# Patient Record
(demographics unavailable — no encounter records)

---

## 2024-10-31 NOTE — HISTORY OF PRESENT ILLNESS
[FreeTextEntry1] : Mr. Dias is  a 67-year-old male with PMHx of recurrent cryptogenic CVA, HTN, DLD, DM, mild cerebral palsy, is here for management of long-term monitoring s/p CVA.   Last CVA in 2024.   He underwent ILR implant for long term monitoring for occult AF.   Denies chest pain, shortness of breath, palpitation, dizziness or LOC except noted above.  EKG (09/04/2024): SR @ 88, , QRS 74, [id number] Echo (08/2023): Normal EF Cardio: Dr. Parker

## 2024-10-31 NOTE — PHYSICAL EXAM
[Normal] : normal S1, S2, no murmur, no rub, no gallop [General Appearance - Well Developed] : well developed [Normal Appearance] : normal appearance [Well Groomed] : well groomed [General Appearance - Well Nourished] : well nourished [No Deformities] : no deformities [General Appearance - In No Acute Distress] : no acute distress [Heart Rate And Rhythm] : heart rate and rhythm were normal [Heart Sounds] : normal S1 and S2 [Murmurs] : no murmurs present [Arterial Pulses Normal] : the arterial pulses were normal [] : no respiratory distress [Clean] : clean [Dry] : dry [Well-Healed] : well-healed [FreeTextEntry1] : Left parasternal [Abdomen Soft] : soft [Nail Clubbing] : no clubbing of the fingernails [Cyanosis, Localized] : no localized cyanosis

## 2024-10-31 NOTE — ASSESSMENT
[FreeTextEntry1] : ## Cryptogenic CVA  s/p ILR ## HTN  - Wound is well healed. There are no signs or symptoms of infection of inflammation. There is no redness, no swelling, no erythema. The patient has no pain. - Device interrogation normal. I interrogated and reprogrammed the device as described above. - Patient is enrolled in remote monitoring services. I reviewed remote transmission process with the patient, as well as schedule and ability to do manual transmission. We also spoke about associated co-payments with remote monitoring that may not be covered by insurance.   - BP controlled today. Continue with Losartan. - Risk factor modification. - RTO in 9-12 months

## 2024-10-31 NOTE — PROCEDURE
[Pacemaker] : pacemaker [de-identified] : PEYMAN [de-identified] : LINQ II [de-identified] : [id number] [de-identified] : 10/8/2024 [de-identified] : Good [de-identified] : No events

## 2024-12-05 NOTE — HISTORY OF PRESENT ILLNESS
[FreeTextEntry1] : Mr. Dias is a 67-year-old man with history of CVA 2005 without residual deficits and recurrent CVA 2024, hypertension, dyslipidemia, diabetes, and mild cerebral palsy presenting for evaluation of lower extremity swelling.  Overall reports being in good health. Previously was very physically active, in his youth would regularly jog 6 miles without issue. More recently developed intermittent palpitations, lasting up to a few minutes at a time. No overt chest discomfort or dyspnea on exertion.  ECG with NSR, normal axis, non-specific T wave abnormality, possible LAE.  LE Duplex 7/2023 - No DVT or superficial thrombophlebitis bilaterally TTE 8/2023 - Normal systolic function, indeterminate diastolic function, fibrocalcific Ao with focal calcification along the NCC, MAC TTE 7/2024: EF 65%, G1DD, degenerative MV, sclerotic Ao, negative bubble study 3 Day Event Monitor 9/2023 - No AF, no SVT, <0.01% PVC burden, no events  Recently was seen by ophtho for vision changes and was referred to the ED for CVA evaluation. MRI demonstrated a new small infarct. He was seen by neurology and recommended cardiology evaluation for possible cardioembolic CVA.  He underwent ILR implant and is following with EP. Today feels well, denies any active cardiopulmonary complaints.  Labs: - Cr 1.0, K 4.2, normal transaminases - A1c 5.5% - , TG 94, LDL 89, HDL 45, A1c 5.9%, TSH 1.62  Meds: - ASA 81mg - Rosuvastatin 20mg - Losartan 100mg - Glipizide 10mg - Tamsulosin 0.4mg

## 2024-12-05 NOTE — ASSESSMENT
[FreeTextEntry1] : Mr. Dias is a 67-year-old man with history of hypertension, dyslipidemia, diabetes, and mild cerebral palsy presenting for evaluation of lower extremity swelling.  Impression: (1) Palpitations, abnormal ECG (LAE, non-specific T wave abnormality) (2) HTN, well controlled (3) DM2, on glipizide, well controlled (4) Dyslipidemia, no recent lipids on file, taking simvastatin 40mg (5) Fibrocalcific Ao with focal calcification of the NCC, as well as MAC (6) CVA 2005 and recurrent CVA 2024, concern for cardioembolic source, negative bubble study  Plan: - Rosuvastatin 20mg - EP evaluation for ILR - Continue aspirin - Continue valsartan 160mg - Discussed GLP1a use, he will consider  Follow up in 1-2 months

## 2025-01-16 NOTE — PHYSICAL EXAM
[General Appearance - Well Developed] : well developed [General Appearance - Well Nourished] : well nourished [Normal Appearance] : normal appearance [Well Groomed] : well groomed [General Appearance - In No Acute Distress] : no acute distress [] : no respiratory distress [Respiration, Rhythm And Depth] : normal respiratory rhythm and effort [Exaggerated Use Of Accessory Muscles For Inspiration] : no accessory muscle use [Abdomen Soft] : soft [Abdomen Tenderness] : non-tender [Costovertebral Angle Tenderness] : no ~M costovertebral angle tenderness [Urethral Meatus] : meatus normal [Penis Abnormality] : normal circumcised penis [Scrotum] : the scrotum was normal [Testes Mass (___cm)] : there were no testicular masses [Anus Abnormality] : the anus and perineum were normal [Rectal Exam - Rectum] : digital rectal exam was normal [Prostate Tenderness] : the prostate was not tender [No Prostate Nodules] : no prostate nodules [Prostate Size ___ gm] : prostate size [unfilled] gm [Normal Station and Gait] : the gait and station were normal for the patient's age [No Focal Deficits] : no focal deficits [Oriented To Time, Place, And Person] : oriented to person, place, and time [Affect] : the affect was normal [Mood] : the mood was normal [Not Anxious] : not anxious [FreeTextEntry1] : sulcus + // smooth- 2023 visit

## 2025-01-16 NOTE — HISTORY OF PRESENT ILLNESS
[Urinary Frequency] : urinary frequency [Dysuria] : dysuria [None] : None [FreeTextEntry1] : 68 y/o M with h/o BPH last seen on our office 10/2023 here for f/u patient is maintained on silodosin 8mg with good results voiding pattern remains unchanged  nocturia 1x good stream he denies dysuria or hematuria  previous history:  initially here to follow up after he was seen in the emergency room in 07/2023 for abdominal pain, rectal bleeding and loose bowel movements. At that time, he was told he had a UTI. He was diagnosed with proctitis and given Abx.   Past and present data reviewed:  bladder scan today 1/15/25-  3ml  TRUS- 10/2023-  64gm, no suspicious nodules identified. Slight asymmetry right lobe greater than left. Calcifications noted bilaterally.  9/2023  Renal/bladder us- Right kidney: 12.5 cm. No hydronephrosis or calculi. Upper pole cyst measuring 7.4 cm. Left kidney:  5.0 cm. No hydronephrosis or calculi. Multiple cysts largest measuring 2.8 cm in the upper pole. Urinary bladder: No debris or calculus. Bilateral ureteral jets are visualized. Prevoid volume of approximately 323 ml.  Postvoid volume is approximately 207 ml. Prostate: Measuring 5.2 x 4.6 x 5.4 cm, volume measuring 67 mL. Appears heterogeneous  07/01/2023 CT scan= bilateral renal cysts, left upper pole calyces diverticulum with internal calcifications associate with cortical scaring, bilateral hydronephrosis mild to bladder secondary to bladder distension.   06/2024  PSA= 0.9 05/2023 PSA (PCP)= 0.7 5/2020 PSA- 1.1 03/2021 PSA= 0.7  06/2024  UA- neg RBC 09/2023 UA= neg 07/01/2023 UA= 4 RBC, bacteria negative nitrite negative,   [Weak Stream] : no weak stream

## 2025-01-16 NOTE — ASSESSMENT
[FreeTextEntry1] : 1. BPH- PVR improved on silodosin 8mg 2. bilateral renal cysts 3. left renal lower pole diverticulum with calcifications 4. LUTS- stable  Plan: -medication renewed -annual PSA testing -rto 12 months  total time spent with encounter including face to face time with patient and review of chart and plan totaled 25 minutes

## 2025-02-04 NOTE — ASSESSMENT
[FreeTextEntry1] : 1. BPH- increased LUTS on silodosin 8mg 2. bilateral renal cysts 3. left renal lower pole diverticulum with calcifications   Plan: -switch to alfuzosin 10mg- dc silodosin -rto 4 weeks for bladder scan and symptom check -annual PSA testing   total time spent with encounter including face to face time with patient and review of chart and plan totaled 25 minutes

## 2025-02-04 NOTE — HISTORY OF PRESENT ILLNESS
[FreeTextEntry1] : 68 y/o M with h/o BPH seen on our office approx 3 weeks ago- presents on a sooner than scheduled visit with c/o increased LUTS x a few days pt reports increased nocturia associated with a variable stream patient is maintained on silodosin 8mg (was previously on Flomax but switched too silodosin for elevated PVR) he denies dysuria, hematuria, constipation or other inciting factors to this increase in symptoms   previous history:  initially here to follow up after he was seen in the emergency room in 07/2023 for abdominal pain, rectal bleeding and loose bowel movements. At that time, he was told he had a UTI. He was diagnosed with proctitis and given Abx.   Past and present data reviewed:  bladder scan 1/15/25-  3ml bladder scan today 2/4/25-  198ml Udip- neg  TRUS- 10/2023-  64gm, no suspicious nodules identified. Slight asymmetry right lobe greater than left. Calcifications noted bilaterally.  9/2023  Renal/bladder us- Right kidney: 12.5 cm. No hydronephrosis or calculi. Upper pole cyst measuring 7.4 cm. Left kidney:  5.0 cm. No hydronephrosis or calculi. Multiple cysts largest measuring 2.8 cm in the upper pole. Urinary bladder: No debris or calculus. Bilateral ureteral jets are visualized. Prevoid volume of approximately 323 ml.  Postvoid volume is approximately 207 ml. Prostate: Measuring 5.2 x 4.6 x 5.4 cm, volume measuring 67 mL. Appears heterogeneous  07/01/2023 CT scan= bilateral renal cysts, left upper pole calyces diverticulum with internal calcifications associate with cortical scaring, bilateral hydronephrosis mild to bladder secondary to bladder distension.   06/2024  PSA= 0.9 05/2023 PSA (PCP)= 0.7 5/2020 PSA- 1.1 03/2021 PSA= 0.7  06/2024  UA- neg RBC 09/2023 UA= neg 07/01/2023 UA= 4 RBC, bacteria negative nitrite negative,   [Urinary Frequency] : urinary frequency [Weak Stream] : no weak stream [Dysuria] : dysuria [None] : None

## 2025-02-04 NOTE — PHYSICAL EXAM
[General Appearance - Well Developed] : well developed [General Appearance - Well Nourished] : well nourished [Normal Appearance] : normal appearance [Well Groomed] : well groomed [General Appearance - In No Acute Distress] : no acute distress [] : no respiratory distress [Respiration, Rhythm And Depth] : normal respiratory rhythm and effort [Exaggerated Use Of Accessory Muscles For Inspiration] : no accessory muscle use [Abdomen Soft] : soft [Abdomen Tenderness] : non-tender [Costovertebral Angle Tenderness] : no ~M costovertebral angle tenderness [Urethral Meatus] : meatus normal [Penis Abnormality] : normal circumcised penis [Scrotum] : the scrotum was normal [Testes Mass (___cm)] : there were no testicular masses [Anus Abnormality] : the anus and perineum were normal [Rectal Exam - Rectum] : digital rectal exam was normal [Prostate Tenderness] : the prostate was not tender [No Prostate Nodules] : no prostate nodules [Prostate Size ___ gm] : prostate size [unfilled] gm [FreeTextEntry1] : sulcus + // smooth- 2023 visit [Normal Station and Gait] : the gait and station were normal for the patient's age [No Focal Deficits] : no focal deficits [Oriented To Time, Place, And Person] : oriented to person, place, and time [Affect] : the affect was normal [Mood] : the mood was normal [Not Anxious] : not anxious

## 2025-03-05 NOTE — PHYSICAL EXAM
[General Appearance - Well Developed] : well developed [General Appearance - Well Nourished] : well nourished [Normal Appearance] : normal appearance [Well Groomed] : well groomed [General Appearance - In No Acute Distress] : no acute distress [] : no respiratory distress [Respiration, Rhythm And Depth] : normal respiratory rhythm and effort [Exaggerated Use Of Accessory Muscles For Inspiration] : no accessory muscle use [Abdomen Soft] : soft [Abdomen Tenderness] : non-tender [Costovertebral Angle Tenderness] : no ~M costovertebral angle tenderness [Urethral Meatus] : meatus normal [Penis Abnormality] : normal circumcised penis [Scrotum] : the scrotum was normal [Testes Mass (___cm)] : there were no testicular masses [Anus Abnormality] : the anus and perineum were normal [Rectal Exam - Rectum] : digital rectal exam was normal [Prostate Tenderness] : the prostate was not tender [No Prostate Nodules] : no prostate nodules [Prostate Size ___ gm] : prostate size [unfilled] gm [Normal Station and Gait] : the gait and station were normal for the patient's age [FreeTextEntry1] : sulcus + // smooth- 2023 visit [No Focal Deficits] : no focal deficits [Oriented To Time, Place, And Person] : oriented to person, place, and time [Affect] : the affect was normal [Mood] : the mood was normal [Not Anxious] : not anxious

## 2025-03-05 NOTE — HISTORY OF PRESENT ILLNESS
[FreeTextEntry1] : 66 y/o M with h/o BPH seen in our office approx 4 weeks ago with c/o increased LUTS x a few days patient had been maintained on silodosin 8mg (was previously on Flomax but switched too silodosin for elevated PVR) but was switched on the last visit to alfuzosin for increased PVR presents today stating he does see an improvement in LUTS on this med  he denies dysuria, hematuria, constipation or other constitutional symptoms  previous history:  initially here to follow up after he was seen in the emergency room in 07/2023 for abdominal pain, rectal bleeding and loose bowel movements. At that time, he was told he had a UTI. He was diagnosed with proctitis and given Abx.   Past and present data reviewed:  bladder scan 1/15/25-  3ml bladder scan  2/4/25-  198ml bladder scan 3/5/25 (on alfuzosin )  2ml Udip- neg 2/4/25 Udip= neg 3/5/25  TRUS- 10/2023-  64gm, no suspicious nodules identified. Slight asymmetry right lobe greater than left. Calcifications noted bilaterally.  9/2023  Renal/bladder us- Right kidney: 12.5 cm. No hydronephrosis or calculi. Upper pole cyst measuring 7.4 cm. Left kidney:  5.0 cm. No hydronephrosis or calculi. Multiple cysts largest measuring 2.8 cm in the upper pole. Urinary bladder: No debris or calculus. Bilateral ureteral jets are visualized. Prevoid volume of approximately 323 ml.  Postvoid volume is approximately 207 ml. Prostate: Measuring 5.2 x 4.6 x 5.4 cm, volume measuring 67 mL. Appears heterogeneous  07/01/2023 CT scan= bilateral renal cysts, left upper pole calyces diverticulum with internal calcifications associate with cortical scaring, bilateral hydronephrosis mild to bladder secondary to bladder distension.   02/2025 PSA - 1.1 06/2024  PSA= 0.9 05/2023 PSA (PCP)= 0.7 5/2020 PSA- 1.1 03/2021 PSA= 0.7  06/2024  UA- neg RBC 09/2023 UA= neg 07/01/2023 UA= 4 RBC, bacteria negative nitrite negative,   [Urinary Frequency] : urinary frequency [Weak Stream] : no weak stream [Dysuria] : dysuria [None] : None

## 2025-03-05 NOTE — ASSESSMENT
[FreeTextEntry1] : 1. BPH- increased LUTS and PVR on silodosin 8mg-improved with Alfuzosin 10mg 2. bilateral renal cysts 3. left renal lower pole diverticulum with calcifications   Plan: -continue alfuzosin 10mg -annual PSA -annual office visit    total time spent with encounter including face to face time with patient and review of chart and plan totaled 25 minutes